# Patient Record
Sex: MALE | Race: WHITE | URBAN - METROPOLITAN AREA
[De-identification: names, ages, dates, MRNs, and addresses within clinical notes are randomized per-mention and may not be internally consistent; named-entity substitution may affect disease eponyms.]

---

## 2020-09-17 ENCOUNTER — DOCUMENTATION ONLY (OUTPATIENT)
Dept: SLEEP MEDICINE | Age: 70
End: 2020-09-17

## 2020-09-17 NOTE — PROGRESS NOTES
Received clinical notes from Dr. Greg Mcfadden office stating patient has oral appliance and is doing well with it but now needs new OA. For insurance to cover a replacement patient will need to a new baseline sleep study. Patient is a previous patient of [facility] with Dr. Bartolo Lopez and would need to first be scheduled as a new patient consult visit with [facility]. LVM for patient to call office to schedule NP sleep consult.  Letter form Dr. Elmo Ying office scanned in chart

## 2020-09-18 ENCOUNTER — DOCUMENTATION ONLY (OUTPATIENT)
Dept: SLEEP MEDICINE | Age: 70
End: 2020-09-18

## 2020-09-30 ENCOUNTER — OFFICE VISIT (OUTPATIENT)
Dept: SLEEP MEDICINE | Age: 70
End: 2020-09-30
Payer: MEDICARE

## 2020-09-30 VITALS — TEMPERATURE: 98.4 F | WEIGHT: 240 LBS | BODY MASS INDEX: 27.77 KG/M2 | HEIGHT: 78 IN

## 2020-09-30 DIAGNOSIS — G47.33 OSA (OBSTRUCTIVE SLEEP APNEA): Primary | ICD-10-CM

## 2020-09-30 PROCEDURE — G8536 NO DOC ELDER MAL SCRN: HCPCS | Performed by: SPECIALIST

## 2020-09-30 PROCEDURE — 99204 OFFICE O/P NEW MOD 45 MIN: CPT | Performed by: SPECIALIST

## 2020-09-30 PROCEDURE — 3017F COLORECTAL CA SCREEN DOC REV: CPT | Performed by: SPECIALIST

## 2020-09-30 PROCEDURE — G8432 DEP SCR NOT DOC, RNG: HCPCS | Performed by: SPECIALIST

## 2020-09-30 PROCEDURE — G8427 DOCREV CUR MEDS BY ELIG CLIN: HCPCS | Performed by: SPECIALIST

## 2020-09-30 PROCEDURE — G8419 CALC BMI OUT NRM PARAM NOF/U: HCPCS | Performed by: SPECIALIST

## 2020-09-30 PROCEDURE — 1101F PT FALLS ASSESS-DOCD LE1/YR: CPT | Performed by: SPECIALIST

## 2020-09-30 NOTE — PROGRESS NOTES
[address] [facility] [place]. [place], [address]  Tel.  [phone]  Fax. 100 [facility] 60  [place], [address] [facility]  Tel.  [phone]  Fax. [phone] [address] [place]  Tel.  [phone]  Fax. [phone]       Chief Complaint       No chief complaint on file. BULL Sanchez is 79 y.o. male seen for evaluation of a sleep disorder. Patient has a history of sleep disordered breathing. He had an initial study at [facility] in [facility] for which he was started on CPAP. He had a UP3 in [address] [place] [address] without a postsurgery sleep study. He had been noted to have snoring and witnessed apnea. He was evaluated at sleep diagnostics. Home sleep test demonstrating severe sleep disordered breathing characterized by an overall AHI of 45/h associated with minimal SaO2 of 71%. During a second study, CPAP was employed at 11 cm with corresponding AHI of 3/h and minimal SaO2 94%. He was reevaluated with a follow-up home sleep test due to reduced compliance. That study demonstrated overall AHI of 73/h with minimal SaO2 of 82%. With CPAP of 11 cm AHI elevated 9.3/h. Due to reduced sleep efficiency a BiPAP study was performed. At a setting of 18/14 cm corresponding AHI of 1.7/h and minimal SaO2 93%. He has been seen by Dr. Saeid Velasco for an oral appliance. He notes that with the device he is sleeping well. He normally retires at 11 PM and awakens at 7 AM.  He may awaken 13 times during the night. He has a history of snoring and potential bruxism. He denies daytime sleepiness when using the appliance       Allergies not on file      He  has no past medical history on file. He  has no past surgical history on file. He family history is not on file. Review of Systems:  Review of Systems   Constitutional: Negative for chills and fever. HENT: Positive for hearing loss. Negative for tinnitus.     Eyes: Negative for blurred vision and double vision. Respiratory: Negative for cough and shortness of breath. Cardiovascular: Negative for chest pain and palpitations. Gastrointestinal: Negative for abdominal pain and heartburn. Genitourinary: Negative for frequency and urgency. Musculoskeletal: Positive for joint pain. Negative for back pain and neck pain. Skin: Negative for itching and rash. Neurological: Negative for dizziness. Psychiatric/Behavioral: Negative for depression. Objective:     Visit Vitals  Temp 98.4 °F (36.9 °C) (Temporal)   Ht 6' 8\" (2.032 m)   Wt 240 lb (108.9 kg)   BMI 26.37 kg/m²     Body mass index is 26.37 kg/m². General:   Conversant, cooperative   Eyes:  Pupils equal and reactive, no nystagmus   Oropharynx:   S/P UP3 , tongue large       Neck:   No carotid bruits; Chest/Lungs:  Clear on auscultation    CVS:  Normal rate, regular rhythm, 1+ distal edema   Skin:  Warm to touch; no obvious rashes   Neuro:  Speech fluent, face symmetrical, tongue movement normal   Psych:  Normal affect,  normal countenance        Assessment:       ICD-10-CM ICD-9-CM    1. PAVEL (obstructive sleep apnea)  G47.33 327.23 SLEEP STUDY UNATTENDED, 4 CHANNEL     History of sleep disordered breathing. Patient is to replace oral appliance; requires follow-up sleep study. Plan:     Orders Placed This Encounter    SLEEP STUDY UNATTENDED, 4 CHANNEL     Order Specific Question:   Reason for Exam     Answer:   History of sleep apnea       * Patient has a history and examination consistent with the diagnosis of sleep apnea. * Sleep testing was ordered for reevaluation. * He was provided information on sleep apnea including corresponding risk factors and the importance of proper treatment. * Treatment options if indicated were reviewed today. Instructions:  o The patient would benefit from weight reduction measures.   o Do not engage in activities requiring a normal degree of alertness if fatigue is present. o The patient understands that untreated or undertreated sleep apnea could impair judgement and the ability to function normally during the day.  o Call or return if symptoms worsen or persist.          Dileep Miguel MD, [facility]  Electronically signed 09/30/20     Visit duration: 18 minutes    This note was created using voice recognition software. Despite editing, there may be syntax errors. This note will not be viewable in [address]

## 2020-10-06 ENCOUNTER — HOSPITAL ENCOUNTER (OUTPATIENT)
Dept: SLEEP MEDICINE | Age: 70
Discharge: HOME | End: 2020-10-06
Payer: MEDICARE

## 2020-10-06 ENCOUNTER — OFFICE VISIT (OUTPATIENT)
Dept: SLEEP MEDICINE | Age: 70
End: 2020-10-06

## 2020-10-06 DIAGNOSIS — G47.33 OSA (OBSTRUCTIVE SLEEP APNEA): Primary | ICD-10-CM

## 2020-10-06 PROCEDURE — 95806 SLEEP STUDY UNATT&RESP EFFT: CPT | Performed by: SPECIALIST

## 2020-10-06 NOTE — PROGRESS NOTES
[address] [facility] [place]. [place], [address]  Tel.  [phone]  Fax. 100 [facility] 60  [place], [address] [facility]  Tel.  [phone]  Fax. [phone] [address] [facility] [place]   Tel.  [phone]  Fax. [phone]       S>Zack Martinez is a 79 y.o. male seen today to receive a home sleep testing unit (HST). · Patient was educated on proper hookup and operation of the HST. · Instruction forms and documentation were reviewed and signed. · The patient demonstrated good understanding of the HST.    O>    There were no vitals taken for this visit. A>  No diagnosis found. P>  · General information regarding operations and maintenance of the device was provided. · He was provided information on sleep apnea including coresponding risk factors and the importance of proper treatment. · Follow-up appointment was made to return the HST. He will be contacted once the results have been reviewed. · He was asked to contact our office for any problems regarding his home sleep test study.

## 2020-10-15 ENCOUNTER — TELEPHONE (OUTPATIENT)
Dept: SLEEP MEDICINE | Age: 70
End: 2020-10-15

## 2020-10-19 ENCOUNTER — DOCUMENTATION ONLY (OUTPATIENT)
Dept: SLEEP MEDICINE | Age: 70
End: 2020-10-19

## 2020-10-29 NOTE — TELEPHONE ENCOUNTER
HSAT demonstrated severe sleep disordered breathing characterized by an overall AHI of 72.1/h associated with minimal SaO2 of 74%. Snoring during 4.6% of the study. Patient had been referred for an oral appliance. Recommendation: HSAT follow-up once oral appliance adjusted.

## 2020-10-29 NOTE — TELEPHONE ENCOUNTER
Patient called for results of sleep study per needs results asap as he is trying to start the process of OAT with Dr Luly Colon office and needs current  Sleep study to be faxed over

## 2020-10-30 ENCOUNTER — TELEPHONE (OUTPATIENT)
Dept: SLEEP MEDICINE | Age: 70
End: 2020-10-30